# Patient Record
Sex: FEMALE | Race: BLACK OR AFRICAN AMERICAN
[De-identification: names, ages, dates, MRNs, and addresses within clinical notes are randomized per-mention and may not be internally consistent; named-entity substitution may affect disease eponyms.]

---

## 2018-02-28 ENCOUNTER — HOSPITAL ENCOUNTER (EMERGENCY)
Dept: HOSPITAL 17 - NEPK | Age: 53
Discharge: HOME | End: 2018-02-28
Payer: COMMERCIAL

## 2018-02-28 VITALS
TEMPERATURE: 98.5 F | RESPIRATION RATE: 16 BRPM | HEART RATE: 76 BPM | OXYGEN SATURATION: 100 % | DIASTOLIC BLOOD PRESSURE: 92 MMHG | SYSTOLIC BLOOD PRESSURE: 193 MMHG

## 2018-02-28 VITALS — WEIGHT: 209.44 LBS | BODY MASS INDEX: 33.66 KG/M2 | HEIGHT: 66 IN

## 2018-02-28 DIAGNOSIS — I10: ICD-10-CM
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
## 2018-02-28 DIAGNOSIS — M19.90: ICD-10-CM

## 2018-02-28 DIAGNOSIS — I20.9: ICD-10-CM

## 2018-02-28 DIAGNOSIS — S39.012A: Primary | ICD-10-CM

## 2018-02-28 PROCEDURE — 99283 EMERGENCY DEPT VISIT LOW MDM: CPT

## 2018-02-28 PROCEDURE — 96372 THER/PROPH/DIAG INJ SC/IM: CPT

## 2018-02-28 NOTE — PD
HPI


Chief Complaint:  Back/ Neck Pain or Injury


Time Seen by Provider:  11:30


Travel History


International Travel<30 days:  No


Contact w/Intl Traveler<30days:  No


Traveled to known affect area:  No





History of Present Illness


HPI


52-year-old female presents to the emergency department for evaluation of right 

low back pain that started yesterday.  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 

she was bending down to draw blood.  When she stood up, she felt a pop in her 

right lower back.  She states she worked throughout the day.  She did take 

Advil during the night last night.  She went to work this morning, but the pain 

is not improving.  Patient denies any fevers or chills.  She denies a traumatic 

injury.  No loss of bowel or bladder control.  No saddle anesthesias.  Patient 

is not currently on any prescribed medications.  She is ambulatory with a 

steady gait in the emergency department.  Patient denies any weakness.  

Exacerbating factor is movement, ambulation.  Alleviating factor is rest, 

Advil.  She states that a coworker did give her a muscle relaxant this morning, 

which did not improve her pain.  Current pain is 9/10, aching, without 

radiation.





PFSH


Past Medical History


Anemia:  Yes (CHRONIC)


Arthritis:  Yes


Depression:  Yes


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


Cancer:  No


Cardiovascular Problems:  No


High Cholesterol:  No


Chest Pain:  Yes


Diabetes:  No


Diminished Hearing:  No


Endocrine:  No


Genitourinary:  No


Hepatitis:  No


Hiatal Hernia:  No


Hypertension:  Yes


Immune Disorder:  No


Musculoskeletal:  Yes (GANGLION CYST ON LEFTKNEE,DEGENERATIVE JOINT DISEASE)


Neurologic:  No


Psychiatric:  No


Reproductive:  No


Respiratory:  No


Immunizations Current:  Yes


Thyroid Disease:  No


PNEUMOCCOCAL Vaccine (Year):  2


Menopausal:  Yes


:  2


Para:  2


:  0





Past Surgical History


AICD:  No


Gynecologic Surgery:  Yes (UTERINE LINING ABLATION)


Joint Replacement:  No


Pacemaker:  No





Social History


Alcohol Use:  No


Tobacco Use:  No


Substance Use:  No





Allergies-Medications


(Allergen,Severity, Reaction):  


Coded Allergies:  


     No Known Allergies (Verified  Adverse Reaction, Unknown, 18)


Reported Meds & Prescriptions





Reported Meds & Active Scripts


Active


No Active Prescriptions or Reported Medications    








Review of Systems


Except as stated in HPI:  all other systems reviewed are Neg





Physical Exam


Narrative


GENERAL: Well-nourished, well-developed female patient, ambulatory.  Afebrile.


SKIN: Focused skin assessment warm/dry.


HEAD: Normocephalic.  Atraumatic.


EYES: No scleral icterus. No injection or drainage. 


NECK: Supple, trachea midline. No JVD or lymphadenopathy.


CARDIOVASCULAR: Regular rate and rhythm without murmurs, gallops, or rubs. 


RESPIRATORY: Breath sounds equal bilaterally. No accessory muscle use.  Lungs 

sounds are clear to auscultation.


GASTROINTESTINAL: Abdomen soft, non-tender, nondistended. 


MUSCULOSKELETAL: No cyanosis, or edema.  Bilateral upper and lower extremity 

strength 5/5.  All extremities are neurovascularly intact.


BACK: Nontender without obvious deformity. No CVA tenderness.  No midline 

spinal tenderness.  She has reproducible tenderness over the right lumbar 

paraspinal musculature.  No clonus.  Straight leg raise is positive on the 

right side.





Data


Data


Last Documented VS





Vital Signs








  Date Time  Temp Pulse Resp B/P (MAP) Pulse Ox O2 Delivery O2 Flow Rate FiO2


 


18 11:25 98.5 76 16 193/92 (125) 100   








Orders





 Orders


Ketorolac Inj (Toradol Inj) (18 11:45)


Orphenadrine Inj (Norflex Inj) (18 11:45)








MDM


Medical Decision Making


Medical Screen Exam Complete:  Yes


Emergency Medical Condition:  Yes


Medical Record Reviewed:  Yes


Differential Diagnosis


Muscle strain versus muscle spasm versus herniated disc


Narrative Course


52-year-old female presents to the emergency department for evaluation of right 

low back pain that started yesterday without traumatic injury.  No red flag 

symptoms.  She is ambulatory.  This does appear to be a muscle strain.  Patient 

is given Toradol 60 mg IM, Norflex 60 mg IM for pain.  Patient is noted to be 

hypertensive in triage.  Patient denies any history of hypertension.  She 

states she does get high blood pressure when she is in pain.  She denies any 

associated symptoms.  Patient is instructed to monitor blood pressure follow-up 

with her primary care physician.  Patient will be discharged with a short-term 

prescription for tramadol, Robaxin.  She is to continue Advil as needed.  

Patient verbalizes agreement and understanding.





The patient was discharged in stable condition with instructions, including 

return instructions and follow up instructions.





Diagnosis





 Primary Impression:  


 Low back strain


 Qualified Codes:  S39.012A - Strain of muscle, fascia and tendon of lower back

, initial encounter


Referrals:  


Primary Care Physician


call for appointment


Patient Instructions:  General Instructions


Departure Forms:  Tests/Procedures, Work Release   Enter return to work date:  

Mar 3, 2018





***Additional Instructions:  


Continue Advil as directed as needed for mild-to-moderate pain.  Take tramadol 

for moderate to severe pain.  Do not drive after taking tramadol.


Take Robaxin as instructed as needed.


Heating pad on low for 20 minutes 4-5 times daily.


Follow-up with your primary care physician.


Return to the emergency department for any acute worsening of symptoms.


***Med/Other Pt SpecificInfo:  Prescription(s) given


Scripts


Methocarbamol (Robaxin) 750 Mg Tab


750 MG PO TID Y for MUSCLE SPASM, #21 TAB 0 Refills


   Prov: Nya Betancourt         18 


Tramadol (Tramadol) 50 Mg Tab


50 MG PO Q6H Y for PAIN, #16 TAB 0 Refills


   Prov: Nya Betancourt         18


Disposition:  01 DISCHARGE HOME


Condition:  Stable











Nya Betancourt 2018 11:42

## 2018-03-15 ENCOUNTER — HOSPITAL ENCOUNTER (EMERGENCY)
Dept: HOSPITAL 17 - NEPK | Age: 53
Discharge: HOME | End: 2018-03-15
Payer: COMMERCIAL

## 2018-03-15 VITALS
TEMPERATURE: 98.1 F | SYSTOLIC BLOOD PRESSURE: 172 MMHG | RESPIRATION RATE: 22 BRPM | DIASTOLIC BLOOD PRESSURE: 88 MMHG | OXYGEN SATURATION: 98 % | HEART RATE: 87 BPM

## 2018-03-15 VITALS — WEIGHT: 215.46 LBS | HEIGHT: 65 IN | BODY MASS INDEX: 35.9 KG/M2

## 2018-03-15 DIAGNOSIS — F32.9: ICD-10-CM

## 2018-03-15 DIAGNOSIS — M19.90: ICD-10-CM

## 2018-03-15 DIAGNOSIS — J06.9: Primary | ICD-10-CM

## 2018-03-15 DIAGNOSIS — D64.9: ICD-10-CM

## 2018-03-15 DIAGNOSIS — Z79.51: ICD-10-CM

## 2018-03-15 DIAGNOSIS — I10: ICD-10-CM

## 2018-03-15 PROCEDURE — 87081 CULTURE SCREEN ONLY: CPT

## 2018-03-15 PROCEDURE — 87804 INFLUENZA ASSAY W/OPTIC: CPT

## 2018-03-15 PROCEDURE — 99284 EMERGENCY DEPT VISIT MOD MDM: CPT

## 2018-03-15 PROCEDURE — 71046 X-RAY EXAM CHEST 2 VIEWS: CPT

## 2018-03-15 PROCEDURE — 87880 STREP A ASSAY W/OPTIC: CPT

## 2018-03-15 NOTE — RADRPT
EXAM DATE/TIME:  03/15/2018 20:42 

 

[facility] COMPARISON:     

No previous studies available for comparison.

 

                     

INDICATIONS :     

Shortness of breath.

                     

 

MEDICAL HISTORY :     

None.          

 

SURGICAL HISTORY :     

None.   

 

ENCOUNTER:     

Initial                                        

 

ACUITY:     

1 day      

 

PAIN SCORE:     

8/10

 

LOCATION:     

Bilateral chest 

 

FINDINGS:     

PA and lateral views of the chest demonstrate the lungs to be symmetrically aerated without evidence 
of mass, infiltrate or effusion.  The cardiomediastinal contours are unremarkable.  Osseous structure
s are intact.

 

CONCLUSION:     

No evidence of acute cardiopulmonary disease.

 

 

 

 Sudheer Nunez MD on March 15, 2018 at 21:15           

Board Certified Radiologist.

 This report was verified electronically.

## 2018-03-15 NOTE — PD
HPI


Chief Complaint:  Cold / Flu Symptoms


Time Seen by Provider:  20:26


Travel History


International Travel<30 days:  No


Contact w/Intl Traveler<30days:  No


Traveled to known affect area:  No





History of Present Illness


HPI


Patient comes to the emergency department complaining of cough, rhinorrhea, 

congestion, sore throat that began around 11:00 this morning.  Patient reports 

using cough drops seem to help ease her throat pain.  Patient reports a burning 

sensation throughout her chest when she coughs or takes a deep breath.  Patient 

denies any known fevers, shortness of breath, abdominal pain, nausea, vomiting, 

headaches, or known sick contacts.  Patient denies anything making symptoms 

worse.  Severity is mild.  Denies any known sick contacts.





PFSH


Past Medical History


Anemia:  Yes (CHRONIC)


Arthritis:  Yes


Depression:  Yes


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


Cancer:  No


Cardiovascular Problems:  No


High Cholesterol:  No


Chest Pain:  Yes


Diabetes:  No


Diminished Hearing:  No


Endocrine:  No


Genitourinary:  No


Hepatitis:  No


Hiatal Hernia:  No


Hypertension:  Yes


Immune Disorder:  No


Musculoskeletal:  Yes (GANGLION CYST ON LEFTKNEE,DEGENERATIVE JOINT DISEASE)


Neurologic:  No


Psychiatric:  No


Reproductive:  No


Respiratory:  No


Immunizations Current:  Yes


Thyroid Disease:  No


PNEUMOCCOCAL Vaccine (Year):  2


Pregnant?:  Not Pregnant


Menopausal:  Yes


:  2


Para:  2


:  0





Past Surgical History


AICD:  No


Gynecologic Surgery:  Yes (UTERINE LINING ABLATION)


Joint Replacement:  No


Pacemaker:  No





Social History


Alcohol Use:  No


Tobacco Use:  No


Substance Use:  No





Allergies-Medications


(Allergen,Severity, Reaction):  


Coded Allergies:  


     No Known Allergies (Verified  Adverse Reaction, Unknown, 18)


Reported Meds & Prescriptions





Reported Meds & Active Scripts


Active


Ventolin Hfa 18 GM Inh (Albuterol Sulfate) 90 Mcg/Act Aer 2 Puff INH Q4H PRN


Flonase Nasal Spray (Fluticasone Nasal Spray) 50 Mcg/Act Spray 100 Mcg EACH 

NARE DAILY


Robaxin (Methocarbamol) 750 Mg Tab 750 Mg PO TID PRN


Tramadol (Tramadol HCl) 50 Mg Tab 50 Mg PO Q6H PRN








Review of Systems


Except as stated in HPI:  all other systems reviewed are Neg





Physical Exam


Narrative


GENERAL: Well-developed, overly nourished, in no acute distress, and non-ill 

appearing.


SKIN: Focused skin assessment warm and dry.


HEAD: Atraumatic. Normocephalic. 


EYES: Pupils equal and round. EOMI. No scleral icterus. No injection or 

drainage. 


ENT: No nasal bleeding.  Mucous membranes pink and moist.  Tympanic membranes 

pearly gray bilaterally.  Posterior pharynx nonerythematous without exudate.  

Uvula is midline.  Patient reports tenderness to palpation of facial sinuses.  

Clear rhinorrhea noted on exam.


NECK: Trachea midline. No cervical lymphadenopathy. Supple.  No nuclear 

rigidity.


CARDIOVASCULAR: Regular rate and rhythm.  No murmur appreciated.


RESPIRATORY: No accessory muscle use.  No respiratory distress. Clear to 

auscultation. Breath sounds equal bilaterally.  Dry cough noted on exam.


MUSCULOSKELETAL: No obvious deformities. No clubbing.  No cyanosis.  No edema.  

Full range of motion.


NEUROLOGICAL: Awake and alert. No obvious cranial nerve deficits.  Motor 

grossly within normal limits. Normal speech.


PSYCHIATRIC: Appropriate mood and affect; insight and judgment normal.





Data


Data


Last Documented VS





Vital Signs








  Date Time  Temp Pulse Resp B/P (MAP) Pulse Ox O2 Delivery O2 Flow Rate FiO2


 


3/15/18 20:18 98.1 87 22 172/88 (116) 98   








Orders





 Orders


Chest, Pa & Lat (3/15/18 )


Group A Rapid Strep Screen (3/15/18 20:30)


Influenzae A/B Antigen (3/15/18 20:30)


Strep Culture (Group A) (3/15/18 20:45)


Ed Discharge Order (3/15/18 21:38)








MDM


Medical Decision Making


Medical Screen Exam Complete:  Yes


Emergency Medical Condition:  Yes


Interpretation(s)


Chest x-ray read by the radiologist shows: No evidence of acute cardiopulmonary 

disease.





Influenza and rapid strep are both negative.


Differential Diagnosis


Influenza, viral pharyngitis, strep pharyngitis, pneumonia, bronchitis, URI, 

viral syndrome


Narrative Course


Patients symptom complex of cough and congestion is consistent with viral URI. 

The patient is non-ill appearing and is in no respiratory distress and 

comfortable. The patient moves air well and oxygen saturations are normal. 

There is no clinical evidence to suggest pneumonia at this time. Plan of care 

and management were discussed with the patient who agreed with plan. The 

patient was instructed to follow up with their physician and instructed to 

return if worsens, progressively worsening shortness of breath or difficulty 

breathing, persistent fever, chest pains or discomfort, inability to keep 

medication or fluids down with or without vomiting, or as needed.





Patient in no obvious distress upon re-evaluation. All pertinent laboratory/

Radiology result(s) discussed with patient. Patient was asked if they wanted to 

speak to my attending, which the patient did not wish to do at this time.  Any 

questions/concerns in reference to patient diagnosis/condition discussed and 

clarified prior to patient's discharge. Reinforced sheer importance of close 

follow up with patient's primary physician or primary care clinic. Instructed 

patient to return to ED immediately, if symptoms return/worsen. Patient showed 

understanding of above instructions.  Further instructions and recommendations 

were detailed in discharge paperwork.  Patient ambulated without difficulty out 

of ED at discharge.





Diagnosis





 Primary Impression:  


 URI, acute


Referrals:  


[facility]


Patient Instructions:  General Instructions, Upper Respiratory Infection (ED)





***Additional Instructions:  


Follow-up with your primary care physician in 3-5 days for reevaluation.  Take 

all medication as prescribed.  Use over-the-counter Tylenol and/or ibuprofen as 

needed for pain and/or fever control.  Follow instructions on the packaging.  

Drink plenty of non-caffeinated nonalcoholic fluids.  Return to the emergency 

department if symptoms get worse.


***Med/Other Pt SpecificInfo:  Prescription(s) given


Scripts


Albuterol 18 GM Inh (Ventolin Hfa 18 GM Inh) 90 Mcg/Act Aer


2 PUFF INH Q4H Y for SHORTNESS OF BREATH, #1 INHALER 0 Refills


   Prov: Enrique Lopez MD         3/15/18 


Fluticasone Nasal Spray (Flonase Nasal Spray) 50 Mcg/Act Spray


100 MCG EACH NARE DAILY for Allergies, #1 BOTTLE 0 Refills


   Prov: Enrique Lopez MD         3/15/18


Disposition:  01 DISCHARGE HOME


Condition:  Stable











Pepe Taipa Mar 15, 2018 20:32